# Patient Record
Sex: FEMALE | Race: WHITE
[De-identification: names, ages, dates, MRNs, and addresses within clinical notes are randomized per-mention and may not be internally consistent; named-entity substitution may affect disease eponyms.]

---

## 2018-04-13 ENCOUNTER — HOSPITAL ENCOUNTER (OUTPATIENT)
Dept: HOSPITAL 62 - RAD | Age: 68
End: 2018-04-13
Attending: SURGERY
Payer: MEDICARE

## 2018-04-13 DIAGNOSIS — E21.0: Primary | ICD-10-CM

## 2018-04-13 PROCEDURE — A9500 TC99M SESTAMIBI: HCPCS

## 2018-04-13 PROCEDURE — 78070 PARATHYROID PLANAR IMAGING: CPT

## 2018-04-13 NOTE — RADIOLOGY REPORT (SQ)
EXAM DESCRIPTION:  NM PARATHYROID IMAGING



COMPLETED DATE/TIME:  4/13/2018 1:56 pm



REASON FOR STUDY:  E21.0 PRIMARY HYPERPARATHYROIDISM E21.0  PRIMARY HYPERPARATHYROIDISM



COMPARISON:  None.



RADIONUCLIDE AND DOSE:  21 millicuries Tc-99m Sestamibi.

The route of agent administration: Intravenous



ADDITIONAL DRUGS AND DOSES:  None.



TECHNIQUE:  Early and delayed images of the neck acquired following radionuclide administration.



LIMITATIONS:  None.



FINDINGS:  Initial imaging demonstrates a focal increased area of uptake along the inferior aspect le
ft lobe thyroid.  This persists on the delayed images.  This could either represent a parathyroid isaiah
noma or thyroid adenoma.

Remainder of the study demonstrates no ectopic activity from the orbits through the diaphragms.



IMPRESSION:  The positive study for abnormal retained uptake over the left lower pole thyroid gland. 
 This could represent a parathyroid adenoma or thyroid adenoma



TECHNICAL DOCUMENTATION:  JOB ID:  [id number]

 2011 [employer]- All Rights Reserved



Reading location - IP/workstation name: [email or web address]